# Patient Record
Sex: FEMALE | Race: WHITE
[De-identification: names, ages, dates, MRNs, and addresses within clinical notes are randomized per-mention and may not be internally consistent; named-entity substitution may affect disease eponyms.]

---

## 2020-12-28 ENCOUNTER — DASHBOARD ENCOUNTERS (OUTPATIENT)
Age: 40
End: 2020-12-28

## 2021-01-05 ENCOUNTER — OFFICE VISIT (OUTPATIENT)
Dept: URBAN - METROPOLITAN AREA TELEHEALTH 2 | Facility: TELEHEALTH | Age: 41
End: 2021-01-05
Payer: COMMERCIAL

## 2021-01-05 DIAGNOSIS — R19.7 DIARRHEA: ICD-10-CM

## 2021-01-05 DIAGNOSIS — Z93.1 PEG (PERCUTANEOUS ENDOSCOPIC GASTROSTOMY) STATUS: ICD-10-CM

## 2021-01-05 DIAGNOSIS — K59.09 OTHER CONSTIPATION: ICD-10-CM

## 2021-01-05 DIAGNOSIS — E72.20 HYPERAMMONEMIA: ICD-10-CM

## 2021-01-05 PROCEDURE — 99202 OFFICE O/P NEW SF 15 MIN: CPT | Performed by: INTERNAL MEDICINE

## 2021-01-05 PROCEDURE — 99442 PHONE E/M BY PHYS 11-20 MIN: CPT | Performed by: INTERNAL MEDICINE

## 2021-01-05 RX ORDER — LACTULOSE 10 G/15ML
15 ML SOLUTION ORAL BID
Qty: 900 ML | Refills: 1 | OUTPATIENT
Start: 2021-01-05 | End: 2021-03-06

## 2021-01-05 RX ORDER — LAMOTRIGINE 150 MG/1
1 TABLET TABLET ORAL ONCE A DAY
Status: ACTIVE | COMMUNITY

## 2021-01-05 RX ORDER — LAMOTRIGINE 100 MG/1
1 TABLET TABLET ORAL ONCE A DAY
Status: ACTIVE | COMMUNITY

## 2021-01-05 RX ORDER — CLONAZEPAM 0.25 MG/1
1 TABLET ON THE TONGUE AND ALLOW TO DISSOLVE 30 MINUTES BEFORE BEDTIME TABLET, ORALLY DISINTEGRATING ORAL ONCE A DAY
Status: ACTIVE | COMMUNITY

## 2021-01-05 NOTE — HPI-TODAY'S VISIT:
Noting some neurological change, on Depakote el ammonia was drawn 77- so she was weaned off Depakote and also changed other meds Nov 23 ammonia 56 Then Dec 9 ammonia 58  Lots of behavioral changes:  not feeding herself or walking w assistance, leaning not even standing, not using her hands (mom is spoon feeding), repetitive movements turning her head to the left.

## 2021-01-28 PROBLEM — 302109006: Status: ACTIVE | Noted: 2021-01-28

## 2021-01-28 PROBLEM — 9360008: Status: ACTIVE | Noted: 2021-01-05

## 2021-02-11 ENCOUNTER — OFFICE VISIT (OUTPATIENT)
Dept: URBAN - METROPOLITAN AREA TELEHEALTH 2 | Facility: TELEHEALTH | Age: 41
End: 2021-02-11

## 2021-02-19 ENCOUNTER — OFFICE VISIT (OUTPATIENT)
Dept: URBAN - METROPOLITAN AREA CLINIC 126 | Facility: CLINIC | Age: 41
End: 2021-02-19